# Patient Record
(demographics unavailable — no encounter records)

---

## 2025-06-23 NOTE — DISCUSSION/SUMMARY
[de-identified] : The patient was instructed to ice, rest, and elevate her foot. She has a cam walker boot at home which she was wearing yesterday and it gave her some relief. She may WBAT in the boot. She understands these injuries take 4-6 weeks to heal. The first 2-3 weeks are typically the worst. Swelling can linger for several weeks to several months depending on how much he is on his feet in a day.  No physical activities. Follow up in 3-4 weeks for repeat evaluation. All questions were answered today.

## 2025-06-23 NOTE — PHYSICAL EXAM
[de-identified] :   Physical exam of her left ankle: She has swelling on the lateral side of the ankle.  Bruising on the lateral side of the foot.  Mild lateral and medial malleoli tenderness.  Positive ATFL, PTFL, and CFL tenderness.  Positive deltoid ligament tenderness.  Nontender over the 1st through 5th metatarsals.  2+ pedal pulses.  Sensory and motor are intact.

## 2025-06-23 NOTE — DATA REVIEWED
[FreeTextEntry1] :   3 x-ray views taken in the office today of her left ankle show no acute displaced fractures or bony abnormalities.

## 2025-06-23 NOTE — HISTORY OF PRESENT ILLNESS
[de-identified] : Patient is a 24-year-old female here for evaluation of her left ankle.  She states that 2 days ago on June 21 she was playing volleyball when she rolled her ankle.  She is been having pain and swelling since then.  She has not had any x-rays done.